# Patient Record
Sex: MALE | Race: WHITE | NOT HISPANIC OR LATINO | URBAN - METROPOLITAN AREA
[De-identification: names, ages, dates, MRNs, and addresses within clinical notes are randomized per-mention and may not be internally consistent; named-entity substitution may affect disease eponyms.]

---

## 2018-02-12 PROBLEM — Z00.00 ENCOUNTER FOR PREVENTIVE HEALTH EXAMINATION: Status: ACTIVE | Noted: 2018-02-12

## 2018-02-14 ENCOUNTER — OUTPATIENT (OUTPATIENT)
Dept: OUTPATIENT SERVICES | Facility: HOSPITAL | Age: 78
LOS: 1 days | End: 2018-02-14
Payer: MEDICARE

## 2018-02-14 ENCOUNTER — APPOINTMENT (OUTPATIENT)
Dept: MRI IMAGING | Facility: CLINIC | Age: 78
End: 2018-02-14
Payer: MEDICARE

## 2018-02-14 DIAGNOSIS — Z00.8 ENCOUNTER FOR OTHER GENERAL EXAMINATION: ICD-10-CM

## 2018-02-14 PROCEDURE — 73721 MRI JNT OF LWR EXTRE W/O DYE: CPT | Mod: 26,RT

## 2018-02-14 PROCEDURE — 73721 MRI JNT OF LWR EXTRE W/O DYE: CPT

## 2024-05-25 ENCOUNTER — EMERGENCY (EMERGENCY)
Facility: HOSPITAL | Age: 84
LOS: 1 days | Discharge: HOME | End: 2024-05-25
Attending: EMERGENCY MEDICINE
Payer: MEDICARE

## 2024-05-25 VITALS
OXYGEN SATURATION: 98 % | DIASTOLIC BLOOD PRESSURE: 91 MMHG | TEMPERATURE: 98 F | HEIGHT: 68 IN | WEIGHT: 197.09 LBS | SYSTOLIC BLOOD PRESSURE: 219 MMHG | HEART RATE: 58 BPM | RESPIRATION RATE: 20 BRPM

## 2024-05-25 VITALS
DIASTOLIC BLOOD PRESSURE: 71 MMHG | HEART RATE: 64 BPM | RESPIRATION RATE: 20 BRPM | TEMPERATURE: 98 F | OXYGEN SATURATION: 98 % | SYSTOLIC BLOOD PRESSURE: 167 MMHG

## 2024-05-25 LAB
ALBUMIN SERPL ELPH-MCNC: 4.1 G/DL — SIGNIFICANT CHANGE UP (ref 3.3–5)
ALP SERPL-CCNC: 92 U/L — SIGNIFICANT CHANGE UP (ref 40–120)
ALT FLD-CCNC: 19 U/L — SIGNIFICANT CHANGE UP (ref 10–45)
ANION GAP SERPL CALC-SCNC: 13 MMOL/L — SIGNIFICANT CHANGE UP (ref 5–17)
APPEARANCE UR: CLEAR — SIGNIFICANT CHANGE UP
AST SERPL-CCNC: 20 U/L — SIGNIFICANT CHANGE UP (ref 10–40)
BACTERIA # UR AUTO: NEGATIVE /HPF — SIGNIFICANT CHANGE UP
BASE EXCESS BLDV CALC-SCNC: 0.2 MMOL/L — SIGNIFICANT CHANGE UP (ref -2–3)
BASOPHILS # BLD AUTO: 0.05 K/UL — SIGNIFICANT CHANGE UP (ref 0–0.2)
BASOPHILS NFR BLD AUTO: 0.4 % — SIGNIFICANT CHANGE UP (ref 0–2)
BILIRUB SERPL-MCNC: 0.5 MG/DL — SIGNIFICANT CHANGE UP (ref 0.2–1.2)
BILIRUB UR-MCNC: NEGATIVE — SIGNIFICANT CHANGE UP
BUN SERPL-MCNC: 30 MG/DL — HIGH (ref 7–23)
CA-I SERPL-SCNC: 1.28 MMOL/L — SIGNIFICANT CHANGE UP (ref 1.15–1.33)
CALCIUM SERPL-MCNC: 9.8 MG/DL — SIGNIFICANT CHANGE UP (ref 8.4–10.5)
CAST: 5 /LPF — HIGH (ref 0–4)
CHLORIDE BLDV-SCNC: 106 MMOL/L — SIGNIFICANT CHANGE UP (ref 96–108)
CHLORIDE SERPL-SCNC: 106 MMOL/L — SIGNIFICANT CHANGE UP (ref 96–108)
CO2 BLDV-SCNC: 28 MMOL/L — HIGH (ref 22–26)
CO2 SERPL-SCNC: 21 MMOL/L — LOW (ref 22–31)
COLOR SPEC: YELLOW — SIGNIFICANT CHANGE UP
CREAT SERPL-MCNC: 1.95 MG/DL — HIGH (ref 0.5–1.3)
DIFF PNL FLD: ABNORMAL
EGFR: 34 ML/MIN/1.73M2 — LOW
EOSINOPHIL # BLD AUTO: 0.01 K/UL — SIGNIFICANT CHANGE UP (ref 0–0.5)
EOSINOPHIL NFR BLD AUTO: 0.1 % — SIGNIFICANT CHANGE UP (ref 0–6)
FINE GRAN CASTS #/AREA URNS AUTO: PRESENT
GAS PNL BLDV: 136 MMOL/L — SIGNIFICANT CHANGE UP (ref 136–145)
GAS PNL BLDV: SIGNIFICANT CHANGE UP
GAS PNL BLDV: SIGNIFICANT CHANGE UP
GLUCOSE BLDV-MCNC: 129 MG/DL — HIGH (ref 70–99)
GLUCOSE SERPL-MCNC: 132 MG/DL — HIGH (ref 70–99)
GLUCOSE UR QL: NEGATIVE MG/DL — SIGNIFICANT CHANGE UP
HCO3 BLDV-SCNC: 26 MMOL/L — SIGNIFICANT CHANGE UP (ref 22–29)
HCT VFR BLD CALC: 43.6 % — SIGNIFICANT CHANGE UP (ref 39–50)
HCT VFR BLDA CALC: 47 % — SIGNIFICANT CHANGE UP (ref 39–51)
HGB BLD CALC-MCNC: 15.5 G/DL — SIGNIFICANT CHANGE UP (ref 12.6–17.4)
HGB BLD-MCNC: 14.2 G/DL — SIGNIFICANT CHANGE UP (ref 13–17)
HYALINE CASTS # UR AUTO: PRESENT
IMM GRANULOCYTES NFR BLD AUTO: 0.3 % — SIGNIFICANT CHANGE UP (ref 0–0.9)
KETONES UR-MCNC: NEGATIVE MG/DL — SIGNIFICANT CHANGE UP
LACTATE BLDV-MCNC: 1.6 MMOL/L — SIGNIFICANT CHANGE UP (ref 0.5–2)
LEUKOCYTE ESTERASE UR-ACNC: NEGATIVE — SIGNIFICANT CHANGE UP
LIDOCAIN IGE QN: 20 U/L — SIGNIFICANT CHANGE UP (ref 7–60)
LYMPHOCYTES # BLD AUTO: 0.67 K/UL — LOW (ref 1–3.3)
LYMPHOCYTES # BLD AUTO: 5.5 % — LOW (ref 13–44)
MCHC RBC-ENTMCNC: 29.5 PG — SIGNIFICANT CHANGE UP (ref 27–34)
MCHC RBC-ENTMCNC: 32.6 GM/DL — SIGNIFICANT CHANGE UP (ref 32–36)
MCV RBC AUTO: 90.5 FL — SIGNIFICANT CHANGE UP (ref 80–100)
MONOCYTES # BLD AUTO: 0.34 K/UL — SIGNIFICANT CHANGE UP (ref 0–0.9)
MONOCYTES NFR BLD AUTO: 2.8 % — SIGNIFICANT CHANGE UP (ref 2–14)
NEUTROPHILS # BLD AUTO: 10.98 K/UL — HIGH (ref 1.8–7.4)
NEUTROPHILS NFR BLD AUTO: 90.9 % — HIGH (ref 43–77)
NITRITE UR-MCNC: NEGATIVE — SIGNIFICANT CHANGE UP
NRBC # BLD: 0 /100 WBCS — SIGNIFICANT CHANGE UP (ref 0–0)
NT-PROBNP SERPL-SCNC: 1121 PG/ML — HIGH (ref 0–300)
PCO2 BLDV: 48 MMHG — SIGNIFICANT CHANGE UP (ref 42–55)
PH BLDV: 7.35 — SIGNIFICANT CHANGE UP (ref 7.32–7.43)
PH UR: 5 — SIGNIFICANT CHANGE UP (ref 5–8)
PLATELET # BLD AUTO: 188 K/UL — SIGNIFICANT CHANGE UP (ref 150–400)
PO2 BLDV: 33 MMHG — SIGNIFICANT CHANGE UP (ref 25–45)
POTASSIUM BLDV-SCNC: 4.8 MMOL/L — SIGNIFICANT CHANGE UP (ref 3.5–5.1)
POTASSIUM SERPL-MCNC: 4.7 MMOL/L — SIGNIFICANT CHANGE UP (ref 3.5–5.3)
POTASSIUM SERPL-SCNC: 4.7 MMOL/L — SIGNIFICANT CHANGE UP (ref 3.5–5.3)
PROT SERPL-MCNC: 7.6 G/DL — SIGNIFICANT CHANGE UP (ref 6–8.3)
PROT UR-MCNC: 300 MG/DL
RBC # BLD: 4.82 M/UL — SIGNIFICANT CHANGE UP (ref 4.2–5.8)
RBC # FLD: 13.2 % — SIGNIFICANT CHANGE UP (ref 10.3–14.5)
RBC CASTS # UR COMP ASSIST: 14 /HPF — HIGH (ref 0–4)
REVIEW: SIGNIFICANT CHANGE UP
SAO2 % BLDV: 47.2 % — LOW (ref 67–88)
SODIUM SERPL-SCNC: 140 MMOL/L — SIGNIFICANT CHANGE UP (ref 135–145)
SP GR SPEC: 1.01 — SIGNIFICANT CHANGE UP (ref 1–1.03)
SQUAMOUS # UR AUTO: 8 /HPF — HIGH (ref 0–5)
TROPONIN T, HIGH SENSITIVITY RESULT: 13 NG/L — SIGNIFICANT CHANGE UP (ref 0–51)
URATE CRY FLD QL MICRO: PRESENT
UROBILINOGEN FLD QL: 0.2 MG/DL — SIGNIFICANT CHANGE UP (ref 0.2–1)
WBC # BLD: 12.09 K/UL — HIGH (ref 3.8–10.5)
WBC # FLD AUTO: 12.09 K/UL — HIGH (ref 3.8–10.5)
WBC UR QL: 5 /HPF — SIGNIFICANT CHANGE UP (ref 0–5)

## 2024-05-25 PROCEDURE — 96375 TX/PRO/DX INJ NEW DRUG ADDON: CPT | Mod: XU

## 2024-05-25 PROCEDURE — 96376 TX/PRO/DX INJ SAME DRUG ADON: CPT | Mod: XU

## 2024-05-25 PROCEDURE — 96374 THER/PROPH/DIAG INJ IV PUSH: CPT | Mod: XU

## 2024-05-25 PROCEDURE — 74177 CT ABD & PELVIS W/CONTRAST: CPT | Mod: 26,MC

## 2024-05-25 PROCEDURE — 99285 EMERGENCY DEPT VISIT HI MDM: CPT | Mod: 25

## 2024-05-25 PROCEDURE — 85014 HEMATOCRIT: CPT

## 2024-05-25 PROCEDURE — 99285 EMERGENCY DEPT VISIT HI MDM: CPT | Mod: GC

## 2024-05-25 PROCEDURE — 83690 ASSAY OF LIPASE: CPT

## 2024-05-25 PROCEDURE — 80053 COMPREHEN METABOLIC PANEL: CPT

## 2024-05-25 PROCEDURE — 74177 CT ABD & PELVIS W/CONTRAST: CPT | Mod: MC

## 2024-05-25 PROCEDURE — 93010 ELECTROCARDIOGRAM REPORT: CPT

## 2024-05-25 PROCEDURE — 82330 ASSAY OF CALCIUM: CPT

## 2024-05-25 PROCEDURE — 71260 CT THORAX DX C+: CPT | Mod: MC

## 2024-05-25 PROCEDURE — 87086 URINE CULTURE/COLONY COUNT: CPT

## 2024-05-25 PROCEDURE — 82435 ASSAY OF BLOOD CHLORIDE: CPT

## 2024-05-25 PROCEDURE — 71045 X-RAY EXAM CHEST 1 VIEW: CPT | Mod: 26

## 2024-05-25 PROCEDURE — 93005 ELECTROCARDIOGRAM TRACING: CPT

## 2024-05-25 PROCEDURE — 82947 ASSAY GLUCOSE BLOOD QUANT: CPT

## 2024-05-25 PROCEDURE — 71045 X-RAY EXAM CHEST 1 VIEW: CPT

## 2024-05-25 PROCEDURE — 85018 HEMOGLOBIN: CPT

## 2024-05-25 PROCEDURE — 82803 BLOOD GASES ANY COMBINATION: CPT

## 2024-05-25 PROCEDURE — 81001 URINALYSIS AUTO W/SCOPE: CPT

## 2024-05-25 PROCEDURE — 83605 ASSAY OF LACTIC ACID: CPT

## 2024-05-25 PROCEDURE — 71260 CT THORAX DX C+: CPT | Mod: 26,MC

## 2024-05-25 PROCEDURE — 85025 COMPLETE CBC W/AUTO DIFF WBC: CPT

## 2024-05-25 PROCEDURE — 84484 ASSAY OF TROPONIN QUANT: CPT

## 2024-05-25 PROCEDURE — 83880 ASSAY OF NATRIURETIC PEPTIDE: CPT

## 2024-05-25 PROCEDURE — 84132 ASSAY OF SERUM POTASSIUM: CPT

## 2024-05-25 PROCEDURE — 84295 ASSAY OF SERUM SODIUM: CPT

## 2024-05-25 RX ORDER — ONDANSETRON 8 MG/1
4 TABLET, FILM COATED ORAL ONCE
Refills: 0 | Status: COMPLETED | OUTPATIENT
Start: 2024-05-25 | End: 2024-05-25

## 2024-05-25 RX ORDER — MORPHINE SULFATE 50 MG/1
4 CAPSULE, EXTENDED RELEASE ORAL ONCE
Refills: 0 | Status: DISCONTINUED | OUTPATIENT
Start: 2024-05-25 | End: 2024-05-25

## 2024-05-25 RX ORDER — ACETAMINOPHEN 500 MG
1000 TABLET ORAL ONCE
Refills: 0 | Status: COMPLETED | OUTPATIENT
Start: 2024-05-25 | End: 2024-05-25

## 2024-05-25 RX ORDER — TAMSULOSIN HYDROCHLORIDE 0.4 MG/1
1 CAPSULE ORAL
Qty: 5 | Refills: 0
Start: 2024-05-25 | End: 2024-05-29

## 2024-05-25 RX ORDER — SODIUM CHLORIDE 9 MG/ML
1000 INJECTION INTRAMUSCULAR; INTRAVENOUS; SUBCUTANEOUS ONCE
Refills: 0 | Status: COMPLETED | OUTPATIENT
Start: 2024-05-25 | End: 2024-05-25

## 2024-05-25 RX ORDER — TAMSULOSIN HYDROCHLORIDE 0.4 MG/1
0.4 CAPSULE ORAL AT BEDTIME
Refills: 0 | Status: DISCONTINUED | OUTPATIENT
Start: 2024-05-25 | End: 2024-05-28

## 2024-05-25 RX ADMIN — ONDANSETRON 4 MILLIGRAM(S): 8 TABLET, FILM COATED ORAL at 02:55

## 2024-05-25 RX ADMIN — MORPHINE SULFATE 4 MILLIGRAM(S): 50 CAPSULE, EXTENDED RELEASE ORAL at 02:51

## 2024-05-25 RX ADMIN — SODIUM CHLORIDE 1000 MILLILITER(S): 9 INJECTION INTRAMUSCULAR; INTRAVENOUS; SUBCUTANEOUS at 02:52

## 2024-05-25 RX ADMIN — MORPHINE SULFATE 4 MILLIGRAM(S): 50 CAPSULE, EXTENDED RELEASE ORAL at 06:18

## 2024-05-25 RX ADMIN — Medication 400 MILLIGRAM(S): at 02:51

## 2024-05-25 RX ADMIN — ONDANSETRON 4 MILLIGRAM(S): 8 TABLET, FILM COATED ORAL at 14:31

## 2024-05-25 RX ADMIN — TAMSULOSIN HYDROCHLORIDE 0.4 MILLIGRAM(S): 0.4 CAPSULE ORAL at 14:30

## 2024-05-25 NOTE — ED ADULT NURSE NOTE - NSFALLUNIVINTERV_ED_ALL_ED
Bed/Stretcher in lowest position, wheels locked, appropriate side rails in place/Call bell, personal items and telephone in reach/Instruct patient to call for assistance before getting out of bed/chair/stretcher/Non-slip footwear applied when patient is off stretcher/Energy to call system/Physically safe environment - no spills, clutter or unnecessary equipment/Purposeful proactive rounding/Room/bathroom lighting operational, light cord in reach

## 2024-05-25 NOTE — ED ADULT NURSE NOTE - ED STAT RN HANDOFF DETAILS
Report received from DAISHA Naqvi patient resting awaiting CT scan with spouse at bedside no  signs of distress noted comfort measures provided.

## 2024-05-25 NOTE — ED ADULT NURSE NOTE - OBJECTIVE STATEMENT
83y male w/ pmh of HTN, HLD presents to ED w/ abdominal pain. Pt states he began feeling left sided abdominal pain with nausea at 7 pm that has persisted. Pt states he felt chills earlier as well. Pt denies fever, vomiting, diarrhea, urinary symptoms, chest pain, shortness of breath.

## 2024-05-25 NOTE — CONSULT NOTE ADULT - SUBJECTIVE AND OBJECTIVE BOX
HPI:  83y Male with PMHx HTN and BPH (follows with Dr. Sandhu) presents with L flank pain onset last night. Urology consulted for kidney stone.   Pt seen at bedside, states excruciating pain started last night. Denies taking any pain medication at home. Endorses nausea.   Denies fever, chills, dysuria, urinary symptoms, hematuria, h/o kidney stones. Unsure what his serum creatinine baseline is.     Pt received 1 dose of tylenol and 2 doses of morphine in the ED - currently pain controlled.     PAST MEDICAL & SURGICAL HISTORY:      FAMILY HISTORY:  No known  malignancy     Social History:  Denies alcohol and drug abuse, nonsmoker     MEDICATIONS  (STANDING):  ondansetron Injectable 4 milliGRAM(s) IV Push once  tamsulosin 0.4 milliGRAM(s) Oral at bedtime    MEDICATIONS  (PRN):    Allergies    No Known Allergies    Intolerances      REVIEW OF SYSTEMS: Pertinent positives and negatives as stated in HPI, otherwise negative    Vital signs  T(C): 36.8 (24 @ 11:44), Max: 36.8 (24 @ 03:16)  HR: 54 (24 @ 11:44)  BP: 161/79 (24 @ 11:44)  SpO2: 96% (24 @ 11:44)  Wt(kg): --    Physical Exam  Gen: NAD  HEENT: normocephalic, atraumatic, no scleral icterus  Pulm: No respiratory distress, no subcostal retractions, no accessory muscle use   Abd: Soft, NT, ND, no rebound tenderness or guarding  : Voiding freely  MSK:  No CVAT, Moving all extremities, full ROM in all extremities  NEURO: A&Ox3, no focal neurological deficits, CN 2-12 grossly intact  SKIN: warm, dry    LABS:  CBC                       14.2   12.09 )-----------( 188      ( 25 May 2024 03:07 )             43.6     BMP       140  |  106  |  30<H>  ----------------------------<  132<H>  4.7   |  21<L>  |  1.95<H>    Ca    9.8      25 May 2024 03:07    TPro  7.6  /  Alb  4.1  /  TBili  0.5  /  DBili  x   /  AST  20  /  ALT  19  /  AlkPhos  92          Urinalysis Basic - ( 25 May 2024 05:02 )    Color: Yellow / Appearance: Clear / S.014 / pH: x  Gluc: x / Ketone: Negative mg/dL  / Bili: Negative / Urobili: 0.2 mg/dL   Blood: x / Protein: 300 mg/dL / Nitrite: Negative   Leuk Esterase: Negative / RBC: 14 /HPF / WBC 5 /HPF   Sq Epi: x / Non Sq Epi: 8 /HPF / Bacteria: Negative /HPF      Urine Cx: pending     Radiology:  < from: CT Chest w/ IV Cont (24 @ 11:54) >  ACC: [id number] EXAM:  CT ABDOMEN AND PELVIS IC   ORDERED BY: MARY CARRILLO     ACC: [id number] EXAM:  CT CHEST IC   ORDERED BY: MARY CARRILLO     PROCEDURE DATE:  2024          INTERPRETATION:  CLINICAL INFORMATION: Left lower quadrant abdominal   pain. Left lower lobe crackles.    COMPARISON: None.    CONTRAST/COMPLICATIONS:  IV Contrast: Omnipaque 350. 90 cc administered. 10 cc discarded.  Oral Contrast: NONE (accession [id number]), None (accession [id number])  Complications: None reportedat time of study completion (accession   [id number]), None (accession [id number])    PROCEDURE:  CT of the Chest, Abdomen and Pelvis was performed.  Sagittal and coronal reformats were performed.    FINDINGS:    CHEST:  LUNGS AND LARGE AIRWAYS: Trace central airway secretions. No large focal   consolidation. Clustered nodules of the posterior/dependent left upper   lobe may reflect distal airways mucus impaction or small airways   infection. Additional mild dependent lung scarring/atelectasis.   Additional sub-4 mm calcified and noncalcified pulmonary nodules.  PLEURA: No pleural effusion or pneumothorax.  VESSELS: Ectatic mid ascending thoracic aorta measures 38 mm, with   atheromatous changes. Main pulmonary artery size is mildly enlarged   measuring 3.3 cm, which may reflect pulmonary arterial hypertension. No   central pulmonary embolus.  HEART: Heart size is qualitatively within normal limits. Multivessel   coronary artery calcifications and mitral annular calcification. Trace   pericardial fluid.  MEDIASTINUM AND CHENTE: No enlarged lymph nodes of the thorax. Esophagus is   nondistended.  CHEST WALL AND LOWER NECK: Bilateral gynecomastia. No chest wall   hematoma. Mildly elevated right hemidiaphragm. Visualized thyroid is   unremarkable.    ABDOMEN AND PELVIS:  LIVER: Liver size towards upper limits of normal, near 18 cm in   craniocaudal dimension. Subcentimeter hepatic hypodensities are too small   to characterize. Main portal vein and hepatic veins are patent.  BILE DUCTS: No distention  GALLBLADDER: Gallbladder fundal adenomyomatosis  SPLEEN: Spleen size within normal limits  PANCREAS: No acute peripancreatic inflammation  ADRENALS: Unremarkable  KIDNEYS/URETERS: Mild to moderate left hydroureteronephrosis secondary to   8mm proximal left ureteral obstructing stone. Marked surrounding   inflammatory changes and perinephric fluid may reflect superimposed   infection or inflammation. Component of forniceal rupture is not excluded   due to amount of perinephric fluid. Mid left ureter is patulous and   tortuous, associated with faint 3 mm calculus or other debris on image   104 series 602. Additional 6 mm nonobstructing left renal calculus at the   lower pole. No right hydronephrosis/nephrolithiasis.    BLADDER: Mildly distended urinary bladder.  REPRODUCTIVE ORGANS: Enlarged prostate with prominent appearing median   lobe hypertrophy extending into the urinary bladder. Correlate with   urinalysis, urine cytology, and PSA to exclude bladder/prostate mass.    BOWEL: Stomach is underdistended. No small bowel distention. Appendix is   not obstructed. Mild stool burden of the colon limits evaluation of the   colonic mucosa. Colonic diverticulosis, without diverticulitis.  PERITONEUM: No ascites.  VESSELS: No abdominal aortic aneurysm. Aortoiliac atheromatous changes.  Proximal to mid mild stenosis secondary to noncalcified plaque.  RETROPERITONEUM/LYMPH NODES: Small volume nodes not enlarged by CT size   criteria.  ABDOMINAL WALL: Small fat-containing umbilical and left inguinal hernias.  BONES: Degenerative changes of the visualized bones. Numerous dense   sclerotic foci of the bones may reflect bone islands in the absence of   known primary malignancy, for example in the left femoral head image 2:30   series 301. L3 vertebral body 16 mm left dense focus, image 159 series   301, is of unclear etiology. Correlate with bone scan.    IMPRESSION:    CHEST:  -No large focal consolidation. Clustered nodules of the   posterior/dependent left upper lobe may reflect distal airways mucus   impaction or small airways infection.  -Multivessel coronary artery calcifications.    ABDOMEN/PELVIS:  -Mild to moderate left hydroureteronephrosis secondary to 8 mm proximal   left ureteral obstructing stone. Marked surrounding inflammation and   fluid may reflect infection or inflammation. Component of forniceal   rupture is not excluded. Mid left ureter is patulous and tortuous,   associated with faint 3 mm calculus or other debris on image 104 series   602.  -Enlarged prostate with prominent appearing median lobe hypertrophy   extending into the urinary bladder. Correlate with urinalysis, urine   cytology, and PSA to exclude bladder/prostate mass.  -Scattered sclerotic foci of the bones, indeterminate at L3 vertebral   body measuring 16 mm. Recommend bone scan to exclude significant lesion.    --- End of Report ---            WARREN ARELLANO M.D., ATTENDING RADIOLOGIST  This document has been electronically signed. May 25 2024 11:10AM    < end of copied text >   HPI:  83y Male with PMHx HTN and BPH (follows with Dr. Sandhu) presents with L flank pain onset last night. Urology consulted for kidney stone.   Pt seen at bedside, states excruciating pain started last night. Denies taking any pain medication at home. Endorses nausea.   Denies fever, chills, dysuria, urinary symptoms, hematuria, h/o kidney stones. Unsure what his serum creatinine baseline is.     Pt received 1 dose of tylenol and 2 doses of morphine in the ED - currently pain controlled.     UPDATE: informed by ED pt's baseline creatinine is ~1.3     PAST MEDICAL & SURGICAL HISTORY:      FAMILY HISTORY:  No known  malignancy     Social History:  Denies alcohol and drug abuse, nonsmoker     MEDICATIONS  (STANDING):  ondansetron Injectable 4 milliGRAM(s) IV Push once  tamsulosin 0.4 milliGRAM(s) Oral at bedtime    MEDICATIONS  (PRN):    Allergies    No Known Allergies    Intolerances      REVIEW OF SYSTEMS: Pertinent positives and negatives as stated in HPI, otherwise negative    Vital signs  T(C): 36.8 (24 @ 11:44), Max: 36.8 (24 @ 03:16)  HR: 54 (24 @ 11:44)  BP: 161/79 (24 @ 11:44)  SpO2: 96% (24 @ 11:44)  Wt(kg): --    Physical Exam  Gen: NAD  HEENT: normocephalic, atraumatic, no scleral icterus  Pulm: No respiratory distress, no subcostal retractions, no accessory muscle use   Abd: Soft, NT, ND, no rebound tenderness or guarding  : Voiding freely  MSK:  No CVAT, Moving all extremities, full ROM in all extremities  NEURO: A&Ox3, no focal neurological deficits, CN 2-12 grossly intact  SKIN: warm, dry    LABS:  CBC                       14.2   12.09 )-----------( 188      ( 25 May 2024 03:07 )             43.6     BMP       140  |  106  |  30<H>  ----------------------------<  132<H>  4.7   |  21<L>  |  1.95<H>    Ca    9.8      25 May 2024 03:07    TPro  7.6  /  Alb  4.1  /  TBili  0.5  /  DBili  x   /  AST  20  /  ALT  19  /  AlkPhos  92          Urinalysis Basic - ( 25 May 2024 05:02 )    Color: Yellow / Appearance: Clear / S.014 / pH: x  Gluc: x / Ketone: Negative mg/dL  / Bili: Negative / Urobili: 0.2 mg/dL   Blood: x / Protein: 300 mg/dL / Nitrite: Negative   Leuk Esterase: Negative / RBC: 14 /HPF / WBC 5 /HPF   Sq Epi: x / Non Sq Epi: 8 /HPF / Bacteria: Negative /HPF      Urine Cx: pending     Radiology:  < from: CT Chest w/ IV Cont (24 @ 11:54) >  ACC: [id number] EXAM:  CT ABDOMEN AND PELVIS IC   ORDERED BY: MARY CARRILLO     ACC: [id number] EXAM:  CT CHEST IC   ORDERED BY: MARY CARRILLO     PROCEDURE DATE:  2024          INTERPRETATION:  CLINICAL INFORMATION: Left lower quadrant abdominal   pain. Left lower lobe crackles.    COMPARISON: None.    CONTRAST/COMPLICATIONS:  IV Contrast: Omnipaque 350. 90 cc administered. 10 cc discarded.  Oral Contrast: NONE (accession [id number]), None (accession [id number])  Complications: None reportedat time of study completion (accession   [id number]), None (accession [id number])    PROCEDURE:  CT of the Chest, Abdomen and Pelvis was performed.  Sagittal and coronal reformats were performed.    FINDINGS:    CHEST:  LUNGS AND LARGE AIRWAYS: Trace central airway secretions. No large focal   consolidation. Clustered nodules of the posterior/dependent left upper   lobe may reflect distal airways mucus impaction or small airways   infection. Additional mild dependent lung scarring/atelectasis.   Additional sub-4 mm calcified and noncalcified pulmonary nodules.  PLEURA: No pleural effusion or pneumothorax.  VESSELS: Ectatic mid ascending thoracic aorta measures 38 mm, with   atheromatous changes. Main pulmonary artery size is mildly enlarged   measuring 3.3 cm, which may reflect pulmonary arterial hypertension. No   central pulmonary embolus.  HEART: Heart size is qualitatively within normal limits. Multivessel   coronary artery calcifications and mitral annular calcification. Trace   pericardial fluid.  MEDIASTINUM AND CHENTE: No enlarged lymph nodes of the thorax. Esophagus is   nondistended.  CHEST WALL AND LOWER NECK: Bilateral gynecomastia. No chest wall   hematoma. Mildly elevated right hemidiaphragm. Visualized thyroid is   unremarkable.    ABDOMEN AND PELVIS:  LIVER: Liver size towards upper limits of normal, near 18 cm in   craniocaudal dimension. Subcentimeter hepatic hypodensities are too small   to characterize. Main portal vein and hepatic veins are patent.  BILE DUCTS: No distention  GALLBLADDER: Gallbladder fundal adenomyomatosis  SPLEEN: Spleen size within normal limits  PANCREAS: No acute peripancreatic inflammation  ADRENALS: Unremarkable  KIDNEYS/URETERS: Mild to moderate left hydroureteronephrosis secondary to   8mm proximal left ureteral obstructing stone. Marked surrounding   inflammatory changes and perinephric fluid may reflect superimposed   infection or inflammation. Component of forniceal rupture is not excluded   due to amount of perinephric fluid. Mid left ureter is patulous and   tortuous, associated with faint 3 mm calculus or other debris on image   104 series 602. Additional 6 mm nonobstructing left renal calculus at the   lower pole. No right hydronephrosis/nephrolithiasis.    BLADDER: Mildly distended urinary bladder.  REPRODUCTIVE ORGANS: Enlarged prostate with prominent appearing median   lobe hypertrophy extending into the urinary bladder. Correlate with   urinalysis, urine cytology, and PSA to exclude bladder/prostate mass.    BOWEL: Stomach is underdistended. No small bowel distention. Appendix is   not obstructed. Mild stool burden of the colon limits evaluation of the   colonic mucosa. Colonic diverticulosis, without diverticulitis.  PERITONEUM: No ascites.  VESSELS: No abdominal aortic aneurysm. Aortoiliac atheromatous changes.  Proximal to mid mild stenosis secondary to noncalcified plaque.  RETROPERITONEUM/LYMPH NODES: Small volume nodes not enlarged by CT size   criteria.  ABDOMINAL WALL: Small fat-containing umbilical and left inguinal hernias.  BONES: Degenerative changes of the visualized bones. Numerous dense   sclerotic foci of the bones may reflect bone islands in the absence of   known primary malignancy, for example in the left femoral head image 2:30   series 301. L3 vertebral body 16 mm left dense focus, image 159 series   301, is of unclear etiology. Correlate with bone scan.    IMPRESSION:    CHEST:  -No large focal consolidation. Clustered nodules of the   posterior/dependent left upper lobe may reflect distal airways mucus   impaction or small airways infection.  -Multivessel coronary artery calcifications.    ABDOMEN/PELVIS:  -Mild to moderate left hydroureteronephrosis secondary to 8 mm proximal   left ureteral obstructing stone. Marked surrounding inflammation and   fluid may reflect infection or inflammation. Component of forniceal   rupture is not excluded. Mid left ureter is patulous and tortuous,   associated with faint 3 mm calculus or other debris on image 104 series   602.  -Enlarged prostate with prominent appearing median lobe hypertrophy   extending into the urinary bladder. Correlate with urinalysis, urine   cytology, and PSA to exclude bladder/prostate mass.  -Scattered sclerotic foci of the bones, indeterminate at L3 vertebral   body measuring 16 mm. Recommend bone scan to exclude significant lesion.    --- End of Report ---            WARREN ARELLANO M.D., ATTENDING RADIOLOGIST  This document has been electronically signed. May 25 2024 11:10AM    < end of copied text >

## 2024-05-25 NOTE — CONSULT NOTE ADULT - ASSESSMENT
83y Male with PMHx HTN and BPH (follows with Dr. Sandhu) presents with L flank pain onset last night. Urology consulted for kidney stone.   Pain controlled on tylenol and morphine. Unsure of pt's creatinine baseline.   AVSS, Labs WBC 12.09 / Hct 43.6 / SCr 1.95   CT: Mild to moderate left hydroureteronephrosis secondary to 8 mm proximal left ureteral obstructing stone    Recs  - Pain control  - IVFs, Hydration   - Flomax   - Antiemetics as needed   - F/U UCx  - Trend Creatinine   - Rest of care pending discussion with attending    TO BE DISCUSSED WITH ATTENDING  83y Male with PMHx HTN and BPH (follows with Dr. Sandhu) presents with L flank pain onset last night. Urology consulted for kidney stone.   Pain controlled on tylenol and morphine. Unsure of pt's creatinine baseline.   AVSS, Labs WBC 12.09 / Hct 43.6 / SCr 1.95   CT: Mild to moderate left hydroureteronephrosis secondary to 8 mm proximal left ureteral obstructing stone    Recs  - Pain control  - IVFs, Hydration   - Flomax   - Antiemetics as needed   - F/U UCx  - F/U with Dr. Sandhu in office this week   - Discussed with Dr. Sandhu and group    [facility]  [address] [place] [address], [place] [address]  [phone]  83y Male with PMHx HTN and BPH (follows with Dr. Sandhu) presents with L flank pain onset last night. Urology consulted for kidney stone.   Pain controlled on tylenol and morphine. Pt's baseline creatinine 1.3.   AVSS, Labs WBC 12.09 / Hct 43.6 / SCr 1.95   CT: Mild to moderate left hydroureteronephrosis secondary to 8 mm proximal left ureteral obstructing stone    Recs  - Pain control  - IVFs, Hydration   - Flomax   - Antiemetics as needed   - F/U UCx  - F/U with Dr. Sandhu in office this week   - Discussed with Dr. Sandhu and group    [facility]  [address] [place] [address], [place] [address]  [phone]  83y Male with PMHx HTN and BPH (follows with Dr. Sandhu) presents with L flank pain onset last night. Urology consulted for kidney stone.   PIain controlled on tylenol and morphine. Pt's baseline creatinine 1.3.   AVSS, Labs WBC 12.09 / Hct 43.6 / SCr 1.95   CT: Mild to moderate left hydroureteronephrosis secondary to 8 mm proximal left ureteral obstructing stone    Recs  - Pain control  - IVFs, Hydration   - Flomax   - Antiemetics as needed   - F/U UCx  - F/U with Dr. Sandhu in office this week   - Discussed with Dr. Sandhu and group    [facility]  [address] [place] [address], [place] [address]  [phone]

## 2024-05-25 NOTE — ED PROVIDER NOTE - PROGRESS NOTE DETAILS
Directed ureteral stone 8 mm on left side.  Urology consulted, agreed to see patient.  Patient now reports improvement of pain after CT scan was done.  Patient educated about EREN and need for admission.  Pending urology recs and admission.  Cira Dodson MD PGY-1 Repaged urology for final recs. Baseline creatine 1.3 in feb 2024 as per cardiologist office. Plan to admit for EREN  Cira Dodson MD PGY-1 Received signout elderly male abdominal pain EREN, CT  resulted as 8 mm kidney stone, lungs clear.  Patient has been treated in the ER and is now pain-free and comfortable.  Seen by urology and contact her attending requesting discharge and follow-up as an outpatient patient is agreeable to same.  Patient is awake and alert chest clear comfortable given strict precautions return if he has any problems.   Shamir Galo MD, Facep

## 2024-05-25 NOTE — ED PROVIDER NOTE - ATTENDING CONTRIBUTION TO CARE
Leroy Mancilla MD, Emergency Medicine Attending Physician:     HPI: 83-year-old male with history of hypertension, BPH, who presents with left lower quadrant abdominal pain with associated nausea that started around 6 PM shortly after he ate pizza.  Patient states that the pain is constant, pressure-like, radiates to the suprapubic region, but not to his back or to his groin.  Reports nausea but no vomiting.  Last BM was yesterday.  Reports associated chills.  Reports episode of dark urine but no blood which resolved several days ago.  No abdominal surgeries, denies abdominal distention.  Ex-smoker 40 to 50 years ago.    Meds: Aspirin 81 mg, metoprolol daily, finasteride (no missed doses of medications including blood pressure medicines)    ROS: denies trauma, fevers, chest pain, shortness of breath, flank pain, back pain, nausea, diarrhea, constipation, obstipation, dysuria.    Exam: Vitals with elevated blood pressure (219/91 on right arm, 218/93 on left arm), otherwise stable vitals  GEN: Uncomfortable appearing, AAOx3  HENT: NCAT, no stridor, MMM  EYES: No icterus, no conjunctival pallor  RESP: No respiratory distress, (+) rhonchi/Rales in left lower lobe, otherwise normal auscultation of the lungs  CV: No tachycardia, normal perfusion  ABD: (+) Tenderness to the left lower quadrant, minimally to the suprapubic region, otherwise nontender.  (+) Reducible ventral and umbilical hernia with no overlying skin changes.  No rebound/guarding/rigidity.  No CVA tenderness.  EXT: There is no calf tenderness or leg swelling. Negative Kwasi's sign.  Neurovascularly intact in all 4 extremities with 5/5 strength, normal sensation, equal pulses and brisk capillary refill, soft compartments.  NEURO: NEURO: AAOx3, Cranial nerves III-XII intact. Strength intact with 5/5 strength in all 4 extremities. Sensation intact to light touch in all 4 extremities. No pronator drift. Normal speech and gait.    MDM: Primary concern for intra-abdominal pathology including diverticulitis.  Will obtain CT Abd/Pelv to assess for acute intraabdominal surgical and infectious pathology.  However due to patient's elevated blood pressure and left lower lobe crackles, will obtain CT chest.  However blood pressures are equal in bilateral upper extremity, low suspicion for aortic dissection especially since patient without any thoracic symptoms.  Will keep patient on cardiac monitor, obtain EKG and troponin to evaluate for possible cardiac abnormality including ACS and arrhythmia.  Pain control, antiemetic and IV fluids and reassess.  Will monitor blood pressure closely, may be pain related, patient with no missed doses of antihypertensive. Leroy Mancilla MD, Emergency Medicine Attending Physician:     HPI: 83-year-old male with history of hypertension, BPH, who presents with left lower quadrant abdominal pain with associated nausea that started around 6 PM shortly after he ate pizza.  Patient states that the pain is constant, pressure-like, radiates to the suprapubic region, but not to his back or to his groin.  Reports nausea but no vomiting.  Last BM was yesterday.  Reports associated chills.  Reports episode of dark urine but no blood which resolved several days ago.  No abdominal surgeries, denies abdominal distention.  Ex-smoker 40 to 50 years ago.    Meds: Aspirin 81 mg, metoprolol daily, finasteride (no missed doses of medications including blood pressure medicines)    ROS: denies trauma, fevers, chest pain, shortness of breath, flank pain, back pain, nausea, diarrhea, constipation, obstipation, dysuria.    Exam: Vitals with elevated blood pressure (219/91 on right arm, 218/93 on left arm), otherwise stable vitals  GEN: Uncomfortable appearing, AAOx3  HENT: NCAT, no stridor, MMM  EYES: No icterus, no conjunctival pallor  RESP: No respiratory distress, (+) rhonchi/Rales in left lower lobe, otherwise normal auscultation of the lungs  CV: No tachycardia, normal perfusion  ABD: (+) Tenderness to the left lower quadrant, minimally to the suprapubic region, otherwise nontender.  (+) Reducible ventral and umbilical hernia with no overlying skin changes.  No rebound/guarding/rigidity.  No CVA tenderness.  EXT: There is no calf tenderness or leg swelling. Negative Kwasi's sign.  Neurovascularly intact in all 4 extremities with 5/5 strength, normal sensation, equal pulses and brisk capillary refill, soft compartments.  NEURO: NEURO: AAOx3, Cranial nerves III-XII intact. Strength intact with 5/5 strength in all 4 extremities. Sensation intact to light touch in all 4 extremities. No pronator drift. Normal speech and gait.    MDM: Primary concern for intra-abdominal pathology including diverticulitis.  Will obtain CT Abd/Pelv to assess for acute intraabdominal surgical and infectious pathology.  However due to patient's elevated blood pressure and left lower lobe crackles, will obtain CT chest.  However blood pressures are equal in bilateral upper extremity, low suspicion for aortic dissection especially since patient without any thoracic symptoms.  Will keep patient on cardiac monitor, obtain EKG and troponin to evaluate for possible cardiac abnormality including ACS and arrhythmia.  Pain control, antiemetic and IV fluids and reassess.  Will monitor blood pressure closely, may be pain related, patient with no missed doses of antihypertensive.    My independent interpretation of the EKG shows:  Normal sinus rhythm with rate of 66 bpm, leftward axis deviation, RBBB, no ST elevations or depressions.  QTc 463. Leroy Mancilla MD, Emergency Medicine Attending Physician:     HPI: 83-year-old male with history of hypertension, BPH, who presents with left lower quadrant abdominal pain with associated nausea that started around 6 PM shortly after he ate pizza.  Patient states that the pain is constant, pressure-like, radiates to the suprapubic region, but not to his back or to his groin.  Reports nausea but no vomiting.  Last BM was yesterday.  Reports associated chills.  Reports episode of dark urine but no blood which resolved several days ago.  No abdominal surgeries, denies abdominal distention.  Ex-smoker 40 to 50 years ago.    Meds: Aspirin 81 mg, metoprolol daily, finasteride (no missed doses of medications including blood pressure medicines)    ROS: denies trauma, fevers, chest pain, shortness of breath, flank pain, back pain, nausea, diarrhea, constipation, obstipation, dysuria.    Exam: Vitals with elevated blood pressure (219/91 on right arm, 218/93 on left arm), otherwise stable vitals  GEN: Uncomfortable appearing, AAOx3  HENT: NCAT, no stridor, MMM  EYES: No icterus, no conjunctival pallor  RESP: No respiratory distress, (+) rhonchi/Rales in left lower lobe, otherwise normal auscultation of the lungs  CV: No tachycardia, normal perfusion  ABD: (+) Tenderness to the left lower quadrant, minimally to the suprapubic region, otherwise nontender.  (+) Reducible ventral and umbilical hernia with no overlying skin changes.  No rebound/guarding/rigidity.  No CVA tenderness.  EXT: There is no calf tenderness or leg swelling. Negative Kwasi's sign.  Neurovascularly intact in all 4 extremities with 5/5 strength, normal sensation, equal pulses and brisk capillary refill, soft compartments.  NEURO: NEURO: AAOx3, Cranial nerves III-XII intact. Strength intact with 5/5 strength in all 4 extremities. Sensation intact to light touch in all 4 extremities. No pronator drift. Normal speech and gait.    MDM: Primary concern for intra-abdominal pathology including diverticulitis.  Will obtain CT Abd/Pelv to assess for acute intraabdominal surgical and infectious pathology.  However due to patient's elevated blood pressure and left lower lobe crackles, will obtain CT chest.  However blood pressures are equal in bilateral upper extremity, low suspicion for aortic dissection especially since patient without any thoracic symptoms.  Will keep patient on cardiac monitor, obtain EKG and troponin to evaluate for possible cardiac abnormality including ACS and arrhythmia.  Pain control, antiemetic and IV fluids and reassess.  Will monitor blood pressure closely, may be pain related, patient with no missed doses of antihypertensive.    My independent interpretation of the EKG shows:  Normal sinus rhythm with rate of 66 bpm, leftward axis deviation, RBBB, no ST elevations or depressions.  QTc 463.    Repeat blood pressure improved after pain medication.  Labs with leukocytosis of 12, elevated creatinine 1.95, troponin 13, proBNP elevated to 1121, urinalysis with hematuria, casts and uric acid crystals, chest x-ray with small left pleural effusion.  Signed out at 7 AM pending CT imaging and close reassessments for further treatment and admission. Leroy Mancilla MD, Emergency Medicine Attending Physician:     HPI: 83-year-old male with history of hypertension, BPH, who presents with left lower quadrant abdominal pain with associated nausea that started around 6 PM shortly after he ate pizza.  Patient states that the pain is constant, pressure-like, radiates to the suprapubic region, but not to his back or to his groin.  Reports nausea but no vomiting.  Last BM was yesterday.  Reports associated chills.  Reports episode of dark urine but no blood which resolved several days ago.  No abdominal surgeries, denies abdominal distention.  Ex-smoker 40 to 50 years ago.    Meds: Aspirin 81 mg, metoprolol daily, finasteride (no missed doses of medications including blood pressure medicines)    ROS: denies trauma, fevers, chest pain, shortness of breath, flank pain, back pain, nausea, diarrhea, constipation, obstipation, dysuria.    Exam: Vitals with elevated blood pressure (219/91 on right arm, 218/93 on left arm), otherwise stable vitals  GEN: Uncomfortable appearing, AAOx3  HENT: NCAT, no stridor, MMM  EYES: No icterus, no conjunctival pallor  RESP: No respiratory distress, (+) rhonchi/Rales in left lower lobe, otherwise normal auscultation of the lungs  CV: No tachycardia, normal perfusion  ABD: (+) Tenderness to the left lower quadrant, minimally to the suprapubic region, otherwise nontender.  (+) Reducible ventral and umbilical hernia with no overlying skin changes.  No rebound/guarding/rigidity.  No CVA tenderness.  EXT: There is no calf tenderness or leg swelling. Negative Kwasi's sign.  Neurovascularly intact in all 4 extremities with 5/5 strength, normal sensation, equal pulses and brisk capillary refill, soft compartments.  NEURO: NEURO: AAOx3, Cranial nerves III-XII intact. Strength intact with 5/5 strength in all 4 extremities. Sensation intact to light touch in all 4 extremities. No pronator drift. Normal speech and gait.    MDM: Primary concern for intra-abdominal pathology including diverticulitis.  Will obtain CT Abd/Pelv to assess for acute intraabdominal surgical and infectious pathology.  However due to patient's elevated blood pressure and left lower lobe crackles, will obtain CT chest.  However blood pressures are equal in bilateral upper extremity, low suspicion for aortic dissection especially since patient without any thoracic symptoms.  Will keep patient on cardiac monitor, obtain EKG and troponin to evaluate for possible cardiac abnormality including ACS and arrhythmia.  Pain control, antiemetic and IV fluids and reassess.  Will monitor blood pressure closely, may be pain related, patient with no missed doses of antihypertensive.    My independent interpretation of the EKG shows:  Normal sinus rhythm with rate of 66 bpm, leftward axis deviation, RBBB, no ST elevations or depressions.  QTc 463.    Repeat blood pressure improved after pain medication.  Labs with leukocytosis of 12, elevated creatinine 1.95, troponin 13, proBNP elevated to 1121, urinalysis with hematuria, casts and uric acid crystals, chest x-ray with small left pleural effusion.  Signed out at 7 AM to Dr. Galo pending CT imaging and close reassessments for further treatment and admission.

## 2024-05-25 NOTE — ED PROVIDER NOTE - PATIENT PORTAL LINK FT
You can access the FollowMyHealth Patient Portal offered by [facility] by registering at the following website: [email or web address]. By joining [facility]’s FollowMyHealth portal, you will also be able to view your health information using other applications (apps) compatible with our system.

## 2024-05-25 NOTE — ED PROVIDER NOTE - NSFOLLOWUPINSTRUCTIONS_ED_ALL_ED_FT
You have been evaluated in the Emergency Department today for your flank pain. Your pain is most likely due to a kidney stone which will pass on its own.    We recommend you take 600mg ibuprofen every 6 hours or tylenol 650mg every 6 hours as needed for pain. If needed, you can alternate these medications so that you take one medication every 3 hours. For instance, at noon take ibuprofen, then at 3pm take tylenol, then at 6pm take ibuprofen.    Please take tamsulosin once daily until the stone passes as per pharmacy instructions.     Please follow up with your urologist, Dr. Sandhu this week.     Return to the Emergency Department if you experience worsening pain, fever, painful urination, blood in urine, weakness, chest pain, difficulty breathing or any other concerning symptoms.

## 2024-05-26 LAB
CULTURE RESULTS: SIGNIFICANT CHANGE UP
SPECIMEN SOURCE: SIGNIFICANT CHANGE UP

## 2024-05-26 NOTE — ED POST DISCHARGE NOTE - RESULT SUMMARY
Pt on incidental imaging finding list today. CT c/a/p with multiple incidental findings including ANDRE nodules, CAD, enlarged prostate extending into bladder (rec urine cytology and PSA), and sclerotic bony lesions (rec bone scan). Pt seen and cleared by urology for ureteral stone findings.

## 2024-05-26 NOTE — ED POST DISCHARGE NOTE - DETAILS
5/26 LVM to discuss. - Gita Quijano PA-C 5/27 Ren Benito PA-C: Incidentals on CTs d/w pt. Will follow up with PMD. Appreciative of call.